# Patient Record
Sex: FEMALE | Race: WHITE | NOT HISPANIC OR LATINO | URBAN - METROPOLITAN AREA
[De-identification: names, ages, dates, MRNs, and addresses within clinical notes are randomized per-mention and may not be internally consistent; named-entity substitution may affect disease eponyms.]

---

## 2022-07-09 ENCOUNTER — TELEPHONE ENCOUNTER (OUTPATIENT)
Dept: URBAN - METROPOLITAN AREA CLINIC 121 | Facility: CLINIC | Age: 62
End: 2022-07-09

## 2022-07-10 ENCOUNTER — TELEPHONE ENCOUNTER (OUTPATIENT)
Dept: URBAN - METROPOLITAN AREA CLINIC 121 | Facility: CLINIC | Age: 62
End: 2022-07-10

## 2022-07-10 RX ORDER — INSULIN GLARGINE 100 [IU]/ML
INJECTION, SOLUTION SUBCUTANEOUS
Refills: 0 | Status: ACTIVE | COMMUNITY
Start: 2015-04-27

## 2022-07-10 RX ORDER — LEVOTHYROXINE SODIUM 125 UG/1
TABLET ORAL
Refills: 0 | Status: ACTIVE | COMMUNITY
Start: 2015-04-27

## 2022-07-10 RX ORDER — LEVOTHYROXINE SODIUM 112 UG/1
TABLET ORAL TAKE AS DIRECTED
Refills: 0 | Status: ACTIVE | COMMUNITY
Start: 2014-12-03

## 2022-07-10 RX ORDER — OMEPRAZOLE 40 MG/1
CAPSULE, DELAYED RELEASE ORAL TWICE A DAY
Refills: 0 | Status: ACTIVE | COMMUNITY
Start: 2015-04-27

## 2022-07-10 RX ORDER — OMEPRAZOLE 20 MG/1
CAPSULE, DELAYED RELEASE ORAL
Refills: 0 | Status: ACTIVE | COMMUNITY
Start: 2015-04-27

## 2022-07-10 RX ORDER — INSULIN GLARGINE 100 [IU]/ML
INJECTION, SOLUTION SUBCUTANEOUS TAKE AS DIRECTED
Refills: 0 | Status: ACTIVE | COMMUNITY
Start: 2015-02-04

## 2022-07-10 RX ORDER — SITAGLIPTIN PHOSPHATE 100 MG
TABLET ORAL
Refills: 0 | Status: ACTIVE | COMMUNITY
Start: 2015-04-27

## 2022-07-10 RX ORDER — SITAGLIPTIN PHOSPHATE 100 MG
TABLET ORAL TAKE AS DIRECTED
Refills: 0 | Status: ACTIVE | COMMUNITY
Start: 2014-12-16

## 2022-07-30 ENCOUNTER — TELEPHONE ENCOUNTER (OUTPATIENT)
Age: 62
End: 2022-07-30

## 2022-07-31 ENCOUNTER — TELEPHONE ENCOUNTER (OUTPATIENT)
Age: 62
End: 2022-07-31

## 2023-09-06 ENCOUNTER — TELEPHONE ENCOUNTER (OUTPATIENT)
Dept: URBAN - METROPOLITAN AREA CLINIC 7 | Facility: CLINIC | Age: 63
End: 2023-09-06

## 2023-10-22 PROBLEM — 112371000119108: Status: ACTIVE | Noted: 2023-10-22

## 2023-10-22 PROBLEM — 30144000: Status: ACTIVE | Noted: 2023-10-22

## 2023-10-22 PROBLEM — 84410009: Status: ACTIVE | Noted: 2023-10-22

## 2023-10-22 PROBLEM — 235938004: Status: ACTIVE | Noted: 2023-10-22

## 2023-10-23 ENCOUNTER — LAB OUTSIDE AN ENCOUNTER (OUTPATIENT)
Dept: URBAN - METROPOLITAN AREA CLINIC 7 | Facility: CLINIC | Age: 63
End: 2023-10-23

## 2023-10-23 ENCOUNTER — OFFICE VISIT (OUTPATIENT)
Dept: URBAN - METROPOLITAN AREA CLINIC 7 | Facility: CLINIC | Age: 63
End: 2023-10-23
Payer: COMMERCIAL

## 2023-10-23 VITALS
BODY MASS INDEX: 30.58 KG/M2 | HEART RATE: 72 BPM | DIASTOLIC BLOOD PRESSURE: 80 MMHG | SYSTOLIC BLOOD PRESSURE: 132 MMHG | HEIGHT: 61 IN | WEIGHT: 162 LBS | TEMPERATURE: 98 F

## 2023-10-23 DIAGNOSIS — Z90.49 ACQUIRED ABSENCE OF OTHER SPECIFIED PARTS OF DIGESTIVE TRACT: ICD-10-CM

## 2023-10-23 DIAGNOSIS — Z90.410 ACQUIRED TOTAL ABSENCE OF PANCREAS: ICD-10-CM

## 2023-10-23 DIAGNOSIS — K76.0 HEPATIC STEATOSIS: ICD-10-CM

## 2023-10-23 DIAGNOSIS — R10.11 RUQ PAIN: ICD-10-CM

## 2023-10-23 DIAGNOSIS — K91.89 OTHER POSTPROCEDURAL COMPLICATIONS AND DISORDERS OF DIGESTIVE SYSTEM: ICD-10-CM

## 2023-10-23 DIAGNOSIS — K83.1 OBSTRUCTION OF BILE DUCT: ICD-10-CM

## 2023-10-23 DIAGNOSIS — E11.9 TYPE 2 DIABETES MELLITUS WITHOUT COMPLICATION: ICD-10-CM

## 2023-10-23 PROCEDURE — 99204 OFFICE O/P NEW MOD 45 MIN: CPT | Performed by: INTERNAL MEDICINE

## 2023-10-23 RX ORDER — INSULIN GLARGINE 100 [IU]/ML
INJECTION, SOLUTION SUBCUTANEOUS
Refills: 0 | Status: DISCONTINUED | COMMUNITY
Start: 2015-04-27

## 2023-10-23 RX ORDER — OMEPRAZOLE 20 MG/1
CAPSULE, DELAYED RELEASE ORAL
Refills: 0 | Status: DISCONTINUED | COMMUNITY
Start: 2015-04-27

## 2023-10-23 RX ORDER — EMPAGLIFLOZIN 25 MG/1
TABLET, FILM COATED ORAL
Qty: 90 TABLET | Status: DISCONTINUED | COMMUNITY

## 2023-10-23 RX ORDER — INSULIN DETEMIR 100 [IU]/ML
INJECTION, SOLUTION SUBCUTANEOUS
Qty: 60 UNSPECIFIED | Status: ACTIVE | COMMUNITY

## 2023-10-23 RX ORDER — SITAGLIPTIN PHOSPHATE 100 MG
TABLET ORAL TAKE AS DIRECTED
Refills: 0 | Status: ACTIVE | COMMUNITY
Start: 2014-12-16

## 2023-10-23 RX ORDER — ATORVASTATIN CALCIUM 20 MG/1
TABLET, FILM COATED ORAL
Qty: 30 TABLET | Status: DISCONTINUED | COMMUNITY

## 2023-10-23 RX ORDER — BUSPIRONE HYDROCHLORIDE 15 MG/1
TABLET ORAL
Qty: 180 TABLET | Status: DISCONTINUED | COMMUNITY

## 2023-10-23 RX ORDER — LEVOTHYROXINE SODIUM 112 UG/1
TABLET ORAL TAKE AS DIRECTED
Refills: 0 | Status: DISCONTINUED | COMMUNITY
Start: 2014-12-03

## 2023-10-23 RX ORDER — LEVOTHYROXINE SODIUM 125 UG/1
TABLET ORAL
Qty: 90 TABLET | Status: ACTIVE | COMMUNITY

## 2023-10-23 RX ORDER — INSULIN LISPRO 100 [IU]/ML
INJECTION, SOLUTION INTRAVENOUS; SUBCUTANEOUS
Qty: 45 UNSPECIFIED | Status: ACTIVE | COMMUNITY

## 2023-10-23 RX ORDER — OMEPRAZOLE 40 MG/1
1 CAPSULE 30 MINUTES BEFORE MORNING MEAL CAPSULE, DELAYED RELEASE ORAL ONCE A DAY
Refills: 0 | Status: DISCONTINUED | COMMUNITY
Start: 2015-04-27

## 2023-10-23 RX ORDER — ATORVASTATIN CALCIUM 20 MG/1
1 TABLET TABLET, FILM COATED ORAL ONCE A DAY
Qty: 30 TABLET | Status: ACTIVE | COMMUNITY

## 2023-10-23 RX ORDER — LEVOTHYROXINE SODIUM 125 UG/1
TABLET ORAL
Refills: 0 | Status: DISCONTINUED | COMMUNITY
Start: 2015-04-27

## 2023-10-23 RX ORDER — METFORMIN HYDROCHLORIDE TABLET 500 MG/1
1 TABLET WITH A MEAL TABLET ORAL TWICE A DAY
Qty: 180 TABLET | Status: ACTIVE | COMMUNITY

## 2023-10-23 NOTE — HPI-TODAY'S VISIT:
Patient was last seen in the practice in 2018 and is new to me.  She has a prior history of benign anastomotic stricture would presented to the hospital in the past for obstructive jaundice and ended up with a Whipple for benign disease.  She had multiple prior failed ERCP attempts and also had a failed ERCP attempt at a tertiary center at the [facility]  She subsequently had improvement in jaundice and was not having abdominal pain fevers chills or weight loss.  She did have a biliary stent that was placed in 2009 and multiple attempts at removal have not been successful.  She did have a small bowel enteroscopy by Dr. Ryan back in 2018 where a presumed hepaticojejunostomy was seen although no obvious opening was identified.  The orifice could not be cannulated although a pancreatic duct stent was noted as well which was removed.  She did ultimately have biliary drainage established via IR with an internal/external biliary drain and plan was for a rendezvous procedure.  Her initial surgery was in 2004 which ultimately had been complicated by hepaticojejunostomy strictures and recurrent episodes of cholangitis.  She has been lost to follow-up since that time. Labs were recently in September 2023 demonstrated creatinine 0.53, sodium 142, alk phos 151, normal bilirubin at 0.6, and normal LFTs.  Hemoglobin 13.6, platelets 263, white count 7.7.  Iron saturation 15 with a ferritin 102.  A1c 6.2. She tells me she has been having fevers on the weekends, 102-103F. Was given antibiotics in the ER for UTI. Some right-sided discomfort associated with these fevers. No jaundice. She tells me she is drain free at this time, and she tells me she did have a rendezvous. Had a procedure with Dr. Bradshaw 2-3 years. Last fever 1 month ago. She had a fibroscan at radiology [facility], no fibrosis, some steatosis.

## 2023-10-30 ENCOUNTER — TELEPHONE ENCOUNTER (OUTPATIENT)
Dept: URBAN - METROPOLITAN AREA CLINIC 23 | Facility: CLINIC | Age: 63
End: 2023-10-30

## 2023-11-07 ENCOUNTER — TELEPHONE ENCOUNTER (OUTPATIENT)
Dept: URBAN - METROPOLITAN AREA CLINIC 8 | Facility: CLINIC | Age: 63
End: 2023-11-07

## 2023-11-07 RX ORDER — CIPROFLOXACIN HYDROCHLORIDE 500 MG/1
1 TABLET TABLET, FILM COATED ORAL TWICE A DAY
Qty: 20 TABLET | Refills: 0 | OUTPATIENT
Start: 2023-11-07 | End: 2023-11-17

## 2023-11-07 RX ORDER — METRONIDAZOLE 500 MG/1
1 TABLET TABLET ORAL THREE TIMES A DAY
Qty: 30 TABLET | Refills: 0 | OUTPATIENT
Start: 2023-11-07 | End: 2023-11-17

## 2023-12-07 ENCOUNTER — OFFICE VISIT (OUTPATIENT)
Dept: URBAN - METROPOLITAN AREA CLINIC 7 | Facility: CLINIC | Age: 63
End: 2023-12-07
Payer: COMMERCIAL

## 2023-12-07 VITALS
DIASTOLIC BLOOD PRESSURE: 70 MMHG | BODY MASS INDEX: 29.45 KG/M2 | WEIGHT: 156 LBS | RESPIRATION RATE: 16 BRPM | HEIGHT: 61 IN | SYSTOLIC BLOOD PRESSURE: 120 MMHG | TEMPERATURE: 97.8 F

## 2023-12-07 DIAGNOSIS — Z90.410 ACQUIRED TOTAL ABSENCE OF PANCREAS: ICD-10-CM

## 2023-12-07 DIAGNOSIS — Z90.49 ACQUIRED ABSENCE OF OTHER SPECIFIED PARTS OF DIGESTIVE TRACT: ICD-10-CM

## 2023-12-07 DIAGNOSIS — K76.0 HEPATIC STEATOSIS: ICD-10-CM

## 2023-12-07 DIAGNOSIS — E11.9 TYPE 2 DIABETES MELLITUS WITHOUT COMPLICATION: ICD-10-CM

## 2023-12-07 PROCEDURE — 99214 OFFICE O/P EST MOD 30 MIN: CPT | Performed by: INTERNAL MEDICINE

## 2023-12-07 RX ORDER — INSULIN LISPRO 100 [IU]/ML
INJECTION, SOLUTION INTRAVENOUS; SUBCUTANEOUS
Qty: 45 UNSPECIFIED | Status: ACTIVE | COMMUNITY

## 2023-12-07 RX ORDER — INSULIN DETEMIR 100 [IU]/ML
INJECTION, SOLUTION SUBCUTANEOUS
Qty: 60 UNSPECIFIED | Status: ACTIVE | COMMUNITY

## 2023-12-07 RX ORDER — ATORVASTATIN CALCIUM 20 MG/1
1 TABLET TABLET, FILM COATED ORAL ONCE A DAY
Qty: 30 TABLET | Status: ACTIVE | COMMUNITY

## 2023-12-07 RX ORDER — SITAGLIPTIN PHOSPHATE 100 MG
TABLET ORAL TAKE AS DIRECTED
Refills: 0 | Status: DISCONTINUED | COMMUNITY
Start: 2014-12-16

## 2023-12-07 RX ORDER — LEVOTHYROXINE SODIUM 125 UG/1
TABLET ORAL
Qty: 90 TABLET | Status: ACTIVE | COMMUNITY

## 2023-12-07 RX ORDER — METFORMIN HYDROCHLORIDE TABLET 500 MG/1
1 TABLET WITH A MEAL TABLET ORAL TWICE A DAY
Qty: 180 TABLET | Status: ACTIVE | COMMUNITY

## 2023-12-07 NOTE — HPI-TODAY'S VISIT:
LV 10/2023. She has a prior history of benign anastomotic stricture would presented to the hospital in the past for obstructive jaundice and ended up with a Whipple for benign disease.  She had multiple prior failed ERCP attempts and also had a failed ERCP attempt at a tertiary center at the [facility].  She subsequently had improvement in jaundice and was not having abdominal pain fevers chills or weight loss.  She did have a biliary stent that was placed in 2009 and multiple attempts at removal have not been successful.  She did have a small bowel enteroscopy by Dr. Ryan back in 2018 where a presumed hepaticojejunostomy was seen although no obvious opening was identified.  The orifice could not be cannulated although a pancreatic duct stent was noted as well which was removed.  She did ultimately have biliary drainage established via IR with an internal/external biliary drain and plan was for a rendezvous procedure.  Her initial surgery was in 2004 which ultimately had been complicated by hepaticojejunostomy strictures and recurrent episodes of cholangitis.  She has been lost to follow-up since that time. Labs were recently in September 2023 demonstrated creatinine 0.53, sodium 142, alk phos 151, normal bilirubin at 0.6, and normal LFTs.  Hemoglobin 13.6, platelets 263, white count 7.7.  Iron saturation 15 with a ferritin 102.  A1c 6.2. She tells me she has been having fevers on the weekends, 102-103F. Was given antibiotics in the ER for UTI. Some right-sided discomfort associated with these fevers. No jaundice. She tells me she is drain free at this time, stent free, and she tells me she did have a rendezvous. Had a procedure with Dr. Bradshaw 2-3 years. Last fever 1 month ago. She had a fibroscan at [facility], no fibrosis, some steatosis. Last visit, I wanted to get the records of her last ERCP which was done by Dr. Bradshaw, but may need additional biliary evaluation.  Plan was to get an MRCP to evaluate the bile duct and will then see if she needs an additional ERCP or endoscopy.  MRCP November 2023 demonstrated findings suggestive of acute on chronic cholangitis likely in the setting of chronic biliary stasis and reflux.  She was treated with Cipro and metronidazole for 10 days.  I also referred her to Dr. Bradshaw for consideration of ERCP. she ended up having a ERCP in mid November 2023 with placement of a plastic stent into her bile duct.  Follow-up now. She tells me she had a rough experience post-ERCP, but stent was placed, and was successful. She did have some fever, and threw up post-procedure. She did not have further antibiotics after her ERCP. Has lost some weight. Decrease appetite.

## 2023-12-12 LAB
% SATURATION: 19
A/G RATIO: 1.4
ABSOLUTE BASOPHILS: 20
ABSOLUTE EOSINOPHILS: 244
ABSOLUTE LYMPHOCYTES: 920
ABSOLUTE MONOCYTES: 312
ABSOLUTE NEUTROPHILS: 2504
ALBUMIN: 4.1
ALKALINE PHOSPHATASE: 119
ALT (SGPT): 22
AST (SGOT): 30
BASOPHILS: 0.5
BILIRUBIN, TOTAL: 0.5
BUN/CREATININE RATIO: (no result)
BUN: 8
C-REACTIVE PROTEIN, QUANT: 2.8
CALCIUM: 9.2
CARBON DIOXIDE, TOTAL: 26
CHLORIDE: 108
CREATININE: 0.67
EGFR: 97
EOSINOPHILS: 6.1
FERRITIN: 56
GLOBULIN, TOTAL: 3
GLUCOSE: 79
HEMATOCRIT: 40.2
HEMOGLOBIN: 13
IRON BINDING CAPACITY: 303
IRON, TOTAL: 59
LYMPHOCYTES: 23
MCH: 28
MCHC: 32.3
MCV: 86.6
MONOCYTES: 7.8
MPV: 10.9
NEUTROPHILS: 62.6
PLATELET COUNT: 208
POTASSIUM: 4.7
PROTEIN, TOTAL: 7.1
RDW: 13.9
RED BLOOD CELL COUNT: 4.64
SODIUM: 140
T4, FREE: 1.2
TSH W/REFLEX TO FT4: 0.19
WHITE BLOOD CELL COUNT: 4

## 2024-01-19 ENCOUNTER — TELEPHONE ENCOUNTER (OUTPATIENT)
Dept: URBAN - METROPOLITAN AREA CLINIC 7 | Facility: CLINIC | Age: 64
End: 2024-01-19

## 2024-01-19 RX ORDER — URSODIOL 500 MG/1
1 TABLET TABLET ORAL TWICE A DAY
Qty: 60 TABLET | Refills: 1 | OUTPATIENT
Start: 2024-01-30

## 2024-02-13 LAB
ALBUMIN/GLOBULIN RATIO: 1.6
ALBUMIN: 4.3
ALKALINE PHOSPHATASE: 118
ALT (SGPT): 18
AST (SGOT): 22
BILIRUBIN, DIRECT: 0.2
BILIRUBIN, INDIRECT: 0.3
BILIRUBIN, TOTAL: 0.5
GLOBULIN: 2.7
PROTEIN, TOTAL: 7

## 2024-03-11 ENCOUNTER — OV EP (OUTPATIENT)
Dept: URBAN - METROPOLITAN AREA CLINIC 7 | Facility: CLINIC | Age: 64
End: 2024-03-11
Payer: COMMERCIAL

## 2024-03-11 VITALS
WEIGHT: 152 LBS | SYSTOLIC BLOOD PRESSURE: 120 MMHG | BODY MASS INDEX: 28.7 KG/M2 | TEMPERATURE: 97.8 F | RESPIRATION RATE: 16 BRPM | DIASTOLIC BLOOD PRESSURE: 70 MMHG | HEIGHT: 61 IN

## 2024-03-11 DIAGNOSIS — Z90.410 ACQUIRED TOTAL ABSENCE OF PANCREAS: ICD-10-CM

## 2024-03-11 DIAGNOSIS — Z90.49 ACQUIRED ABSENCE OF OTHER SPECIFIED PARTS OF DIGESTIVE TRACT: ICD-10-CM

## 2024-03-11 DIAGNOSIS — E11.9 TYPE 2 DIABETES MELLITUS WITHOUT COMPLICATION: ICD-10-CM

## 2024-03-11 DIAGNOSIS — K83.1 OBSTRUCTION OF BILE DUCT: ICD-10-CM

## 2024-03-11 DIAGNOSIS — R10.11 RUQ PAIN: ICD-10-CM

## 2024-03-11 DIAGNOSIS — K76.0 HEPATIC STEATOSIS: ICD-10-CM

## 2024-03-11 DIAGNOSIS — K91.89 OTHER POSTPROCEDURAL COMPLICATIONS AND DISORDERS OF DIGESTIVE SYSTEM: ICD-10-CM

## 2024-03-11 PROCEDURE — 99214 OFFICE O/P EST MOD 30 MIN: CPT | Performed by: INTERNAL MEDICINE

## 2024-03-11 RX ORDER — URSODIOL 500 MG/1
1 TABLET TABLET ORAL TWICE A DAY
Qty: 60 TABLET | Refills: 1 | Status: ACTIVE | COMMUNITY
Start: 2024-01-30

## 2024-03-11 RX ORDER — ATORVASTATIN CALCIUM 20 MG/1
1 TABLET TABLET, FILM COATED ORAL ONCE A DAY
Qty: 30 TABLET | Status: ACTIVE | COMMUNITY

## 2024-03-11 RX ORDER — URSODIOL 500 MG/1
1 TABLET TABLET ORAL TWICE A DAY
Qty: 180 | Refills: 3
Start: 2024-01-30

## 2024-03-11 RX ORDER — INSULIN DETEMIR 100 [IU]/ML
INJECTION, SOLUTION SUBCUTANEOUS
Qty: 60 UNSPECIFIED | Status: ACTIVE | COMMUNITY

## 2024-03-11 RX ORDER — INSULIN LISPRO 100 [IU]/ML
INJECTION, SOLUTION INTRAVENOUS; SUBCUTANEOUS
Qty: 45 UNSPECIFIED | Status: ACTIVE | COMMUNITY

## 2024-03-11 RX ORDER — LEVOTHYROXINE SODIUM 112 UG/1
1 TABLET IN THE MORNING ON AN EMPTY STOMACH TABLET ORAL ONCE A DAY
Qty: 90 TABLET | Status: ACTIVE | COMMUNITY

## 2024-03-11 RX ORDER — METFORMIN HYDROCHLORIDE TABLET 500 MG/1
1 TABLET WITH A MEAL TABLET ORAL TWICE A DAY
Qty: 180 TABLET | Status: ACTIVE | COMMUNITY

## 2024-03-11 NOTE — HPI-TODAY'S VISIT:
LV 12/2023. She has a prior history of benign anastomotic stricture would presented to the hospital in the past for obstructive jaundice and ended up with a Whipple for benign disease.  She had multiple prior failed ERCP attempts and also had a failed ERCP attempt at a tertiary center at the [facility].  She subsequently had improvement in jaundice and was not having abdominal pain fevers chills or weight loss.  She did have a biliary stent that was placed in 2009 and multiple attempts at removal have not been successful.  She did have a small bowel enteroscopy by Dr. Ryan back in 2018 where a presumed hepaticojejunostomy was seen although no obvious opening was identified.  The orifice could not be cannulated although a pancreatic duct stent was noted as well which was removed.  She did ultimately have biliary drainage established via IR with an internal/external biliary drain and plan was for a rendezvous procedure.  Her initial surgery was in 2004 which ultimately had been complicated by hepaticojejunostomy strictures and recurrent episodes of cholangitis.  She has been lost to follow-up since that time. Labs were recently in September 2023 demonstrated creatinine 0.53, sodium 142, alk phos 151, normal bilirubin at 0.6, and normal LFTs.  Hemoglobin 13.6, platelets 263, white count 7.7.  Iron saturation 15 with a ferritin 102.  A1c 6.2. She tells me she has been having fevers on the weekends, 102-103F. Was given antibiotics in the ER for UTI. Some right-sided discomfort associated with these fevers. No jaundice. She tells me she is drain free at this time, stent free, and she tells me she did have a rendezvous. Had a procedure with Dr. Bradshaw 2-3 years. Last fever 1 month ago. She had a fibroscan at [facility], no fibrosis, some steatosis. Last visit, I wanted to get the records of her last ERCP which was done by Dr. Bradshaw, but may need additional biliary evaluation.  Plan was to get an MRCP to evaluate the bile duct and will then see if she needs an additional ERCP or endoscopy.  MRCP November 2023 demonstrated findings suggestive of acute on chronic cholangitis likely in the setting of chronic biliary stasis and reflux.  She was treated with Cipro and metronidazole for 10 days.  I also referred her to Dr. Bradshaw for consideration of ERCP. she ended up having a ERCP in mid November 2023 with placement of a plastic stent into her bile duct.  Follow-up now. She tells me she had a rough experience post-ERCP, but stent was placed, and was successful. She did have some fever, and threw up post-procedure. She did not have further antibiotics after her ERCP. Has lost some weight. Decrease appetite. Plan was repeat ERCP for removal of biliary plastic stent, but she is better from a cholangitis perspective. Will need to see if her anastomotic stenosis is better, or if she has stones. Will get LFT's. LFT's normal in 1/2024 and repeat ERCP 1/2024 with occluded plastic stent (removed with biliary sweep) and balloon dilation performed to 8 mm. No stent was placed. Repeat LFTs in 2/2024 were normal. FU now. She is doing well status post ERCP recently and normal LFTs post procedure. She has been on ursodiol. No fevers, no jaundice. Some RUQ pain.

## 2024-07-12 ENCOUNTER — TELEPHONE ENCOUNTER (OUTPATIENT)
Dept: URBAN - METROPOLITAN AREA CLINIC 7 | Facility: CLINIC | Age: 64
End: 2024-07-12

## 2024-07-24 ENCOUNTER — OFFICE VISIT (OUTPATIENT)
Dept: URBAN - METROPOLITAN AREA CLINIC 7 | Facility: CLINIC | Age: 64
End: 2024-07-24
Payer: COMMERCIAL

## 2024-07-24 ENCOUNTER — LAB OUTSIDE AN ENCOUNTER (OUTPATIENT)
Dept: URBAN - METROPOLITAN AREA CLINIC 7 | Facility: CLINIC | Age: 64
End: 2024-07-24

## 2024-07-24 ENCOUNTER — DASHBOARD ENCOUNTERS (OUTPATIENT)
Age: 64
End: 2024-07-24

## 2024-07-24 VITALS
WEIGHT: 150 LBS | TEMPERATURE: 97.6 F | DIASTOLIC BLOOD PRESSURE: 70 MMHG | HEIGHT: 61 IN | BODY MASS INDEX: 28.32 KG/M2 | SYSTOLIC BLOOD PRESSURE: 120 MMHG | RESPIRATION RATE: 16 BRPM

## 2024-07-24 DIAGNOSIS — K83.1 OBSTRUCTION OF BILE DUCT: ICD-10-CM

## 2024-07-24 DIAGNOSIS — Z90.410 ACQUIRED TOTAL ABSENCE OF PANCREAS: ICD-10-CM

## 2024-07-24 DIAGNOSIS — E11.9 TYPE 2 DIABETES MELLITUS WITHOUT COMPLICATION: ICD-10-CM

## 2024-07-24 DIAGNOSIS — K76.0 HEPATIC STEATOSIS: ICD-10-CM

## 2024-07-24 PROCEDURE — 99214 OFFICE O/P EST MOD 30 MIN: CPT | Performed by: INTERNAL MEDICINE

## 2024-07-24 RX ORDER — LEVOTHYROXINE SODIUM 150 UG/1
1 TABLET IN THE MORNING ON AN EMPTY STOMACH TABLET ORAL ONCE A DAY
Qty: 90 TABLET | Status: ACTIVE | COMMUNITY

## 2024-07-24 RX ORDER — INSULIN DETEMIR 100 [IU]/ML
INJECTION, SOLUTION SUBCUTANEOUS
Qty: 60 UNSPECIFIED | Status: ACTIVE | COMMUNITY

## 2024-07-24 RX ORDER — INSULIN LISPRO 100 [IU]/ML
INJECTION, SOLUTION INTRAVENOUS; SUBCUTANEOUS
Qty: 45 UNSPECIFIED | Status: ACTIVE | COMMUNITY

## 2024-07-24 RX ORDER — ATORVASTATIN CALCIUM 20 MG/1
1 TABLET TABLET, FILM COATED ORAL ONCE A DAY
Qty: 30 TABLET | Status: ACTIVE | COMMUNITY

## 2024-07-24 RX ORDER — URSODIOL 500 MG/1
1 TABLET TABLET ORAL TWICE A DAY
Qty: 180 | Refills: 3 | Status: ACTIVE | COMMUNITY
Start: 2024-01-30

## 2024-07-24 RX ORDER — METFORMIN HYDROCHLORIDE TABLET 500 MG/1
1 TABLET WITH A MEAL TABLET ORAL TWICE A DAY
Qty: 180 TABLET | Status: ACTIVE | COMMUNITY

## 2024-07-24 NOTE — HPI-TODAY'S VISIT:
LV 3/2024. She has a prior history of benign anastomotic stricture would presented to the hospital in the past for obstructive jaundice and ended up with a Whipple for benign disease.  She had multiple prior failed ERCP attempts and also had a failed ERCP attempt at a tertiary center at the [facility].  She subsequently had improvement in jaundice and was not having abdominal pain fevers chills or weight loss.  She did have a biliary stent that was placed in 2009 and multiple attempts at removal have not been successful.  She did have a small bowel enteroscopy by Dr. Ryan back in 2018 where a presumed hepaticojejunostomy was seen although no obvious opening was identified.  The orifice could not be cannulated although a pancreatic duct stent was noted as well which was removed.  She did ultimately have biliary drainage established via IR with an internal/external biliary drain and plan was for a rendezvous procedure.  Her initial surgery was in 2004 which ultimately had been complicated by hepaticojejunostomy strictures and recurrent episodes of cholangitis.  She has been lost to follow-up since that time. Labs were recently in September 2023 demonstrated creatinine 0.53, sodium 142, alk phos 151, normal bilirubin at 0.6, and normal LFTs.  Hemoglobin 13.6, platelets 263, white count 7.7.  Iron saturation 15 with a ferritin 102.  A1c 6.2. She tells me she has been having fevers on the weekends, 102-103F. Was given antibiotics in the ER for UTI. Some right-sided discomfort associated with these fevers. No jaundice. She tells me she is drain free at this time, stent free, and she tells me she did have a rendezvous. Had a procedure with Dr. Bradshaw 2-3 years. Last fever 1 month ago. She had a fibroscan at [facility], no fibrosis, some steatosis. Last visit, I wanted to get the records of her last ERCP which was done by Dr. Bradshaw, but may need additional biliary evaluation.  Plan was to get an MRCP to evaluate the bile duct and will then see if she needs an additional ERCP or endoscopy.  MRCP November 2023 demonstrated findings suggestive of acute on chronic cholangitis likely in the setting of chronic biliary stasis and reflux.  She was treated with Cipro and metronidazole for 10 days.  I also referred her to Dr. Bradshaw for consideration of ERCP. She ended up having a ERCP in mid November 2023 with placement of a plastic stent into her bile duct.  Follow-up now. She tells me she had a rough experience post-ERCP, but stent was placed, and was successful. She did have some fever, and threw up post-procedure. She did not have further antibiotics after her ERCP. Has lost some weight. Decrease appetite. Plan was repeat ERCP for removal of biliary plastic stent, but she is better from a cholangitis perspective. Will need to see if her anastomotic stenosis is better, or if she has stones. Will get LFT's. LFT's normal in 1/2024 and repeat ERCP 1/2024 with occluded plastic stent (removed with biliary sweep) and balloon dilation performed to 8 mm. No stent was placed. Repeat LFTs in 2/2024 were normal. She is doing well status post ERCP recently and normal LFTs post procedure. She has been on ursodiol. No fevers, no jaundice. Some RUQ pain. She wsa feeling largely well since her procedure, normal LFTs 2/2024, and will continue dustin to maintain biliary flow. FU 6 months, and repeat ERCPs will be done as needed. She had a cologuard about 3 years ago, and this was negative. CT PE recently with no PE. Had fibroscan with findings consistent with F3/F4 fibrosis. This was done after an US recently showed hepatic steatosis, possible CBD dilation to 1.2 cm. FU now.  She was having RUQ pain, and this led to US. Not having fevers, no jaundice. Non-toxic. Non-acute situation. Still taking ursodiol. Alk phos was recently 134, normal AST/ALT, normal bilirubin. Has some pruritus. Pain is more so with movement, not so much. Weight is down 2 lbs.

## 2024-08-02 ENCOUNTER — TELEPHONE ENCOUNTER (OUTPATIENT)
Dept: URBAN - METROPOLITAN AREA CLINIC 7 | Facility: CLINIC | Age: 64
End: 2024-08-02

## 2024-08-02 ENCOUNTER — LAB OUTSIDE AN ENCOUNTER (OUTPATIENT)
Dept: URBAN - METROPOLITAN AREA CLINIC 7 | Facility: CLINIC | Age: 64
End: 2024-08-02

## 2024-09-12 ENCOUNTER — OFFICE VISIT (OUTPATIENT)
Dept: URBAN - METROPOLITAN AREA CLINIC 7 | Facility: CLINIC | Age: 64
End: 2024-09-12

## 2024-09-25 ENCOUNTER — OFFICE VISIT (OUTPATIENT)
Dept: URBAN - METROPOLITAN AREA CLINIC 7 | Facility: CLINIC | Age: 64
End: 2024-09-25
Payer: COMMERCIAL

## 2024-09-25 VITALS
WEIGHT: 151 LBS | HEIGHT: 61 IN | SYSTOLIC BLOOD PRESSURE: 120 MMHG | RESPIRATION RATE: 16 BRPM | TEMPERATURE: 97.6 F | HEART RATE: 77 BPM | BODY MASS INDEX: 28.51 KG/M2 | DIASTOLIC BLOOD PRESSURE: 80 MMHG

## 2024-09-25 DIAGNOSIS — K76.0 HEPATIC STEATOSIS: ICD-10-CM

## 2024-09-25 DIAGNOSIS — R94.5 LIVER FUNCTION STUDY, ABNORMAL: ICD-10-CM

## 2024-09-25 DIAGNOSIS — Z90.49 ACQUIRED ABSENCE OF OTHER SPECIFIED PARTS OF DIGESTIVE TRACT: ICD-10-CM

## 2024-09-25 DIAGNOSIS — Z90.410 ACQUIRED TOTAL ABSENCE OF PANCREAS: ICD-10-CM

## 2024-09-25 DIAGNOSIS — E11.9 TYPE 2 DIABETES MELLITUS WITHOUT COMPLICATION: ICD-10-CM

## 2024-09-25 PROCEDURE — 99214 OFFICE O/P EST MOD 30 MIN: CPT | Performed by: INTERNAL MEDICINE

## 2024-09-25 RX ORDER — URSODIOL 500 MG/1
1 TABLET TABLET ORAL TWICE A DAY
Qty: 180 | Refills: 3 | Status: ACTIVE | COMMUNITY
Start: 2024-01-30

## 2024-09-25 RX ORDER — LEVOTHYROXINE SODIUM 150 UG/1
1 TABLET IN THE MORNING ON AN EMPTY STOMACH TABLET ORAL ONCE A DAY
Qty: 90 TABLET | Status: ACTIVE | COMMUNITY

## 2024-09-25 RX ORDER — INSULIN LISPRO 100 [IU]/ML
INJECTION, SOLUTION INTRAVENOUS; SUBCUTANEOUS
Qty: 45 UNSPECIFIED | Status: ACTIVE | COMMUNITY

## 2024-09-25 RX ORDER — METFORMIN HYDROCHLORIDE TABLET 500 MG/1
1 TABLET WITH A MEAL TABLET ORAL TWICE A DAY
Qty: 180 TABLET | Status: ACTIVE | COMMUNITY

## 2024-09-25 RX ORDER — ATORVASTATIN CALCIUM 20 MG/1
1 TABLET TABLET, FILM COATED ORAL ONCE A DAY
Qty: 30 TABLET | Status: ACTIVE | COMMUNITY

## 2024-09-25 RX ORDER — INSULIN DETEMIR 100 [IU]/ML
INJECTION, SOLUTION SUBCUTANEOUS
Qty: 60 UNSPECIFIED | Status: ACTIVE | COMMUNITY

## 2024-09-25 NOTE — HPI-TODAY'S VISIT:
LV 7/2024. She has a prior history of benign anastomotic stricture would presented to the hospital in the past for obstructive jaundice and ended up with a Whipple for benign disease.  She had multiple prior failed ERCP attempts and also had a failed ERCP attempt at a tertiary center at the [facility].  She subsequently had improvement in jaundice and was not having abdominal pain fevers chills or weight loss.  She did have a biliary stent that was placed in 2009 and multiple attempts at removal have not been successful.  She did have a small bowel enteroscopy by Dr. Ryan back in 2018 where a presumed hepaticojejunostomy was seen although no obvious opening was identified.  The orifice could not be cannulated although a pancreatic duct stent was noted as well which was removed.  She did ultimately have biliary drainage established via IR with an internal/external biliary drain and plan was for a rendezvous procedure.  Her initial surgery was in 2004 which ultimately had been complicated by hepaticojejunostomy strictures and recurrent episodes of cholangitis.  She has been lost to follow-up since that time. Labs were recently in September 2023 demonstrated creatinine 0.53, sodium 142, alk phos 151, normal bilirubin at 0.6, and normal LFTs.  Hemoglobin 13.6, platelets 263, white count 7.7.  Iron saturation 15 with a ferritin 102.  A1c 6.2. She tells me she has been having fevers on the weekends, 102-103F. Was given antibiotics in the ER for UTI. Some right-sided discomfort associated with these fevers. No jaundice. She tells me she is drain free at this time, stent free, and she tells me she did have a rendezvous. Had a procedure with Dr. Bradshaw 2-3 years. Last fever 1 month ago. She had a fibroscan at [facility], no fibrosis, some steatosis. Last visit, I wanted to get the records of her last ERCP which was done by Dr. Bradshaw, but may need additional biliary evaluation.  Plan was to get an MRCP to evaluate the bile duct and will then see if she needs an additional ERCP or endoscopy.  MRCP November 2023 demonstrated findings suggestive of acute on chronic cholangitis likely in the setting of chronic biliary stasis and reflux.  She was treated with Cipro and metronidazole for 10 days.  I also referred her to Dr. Bradshaw for consideration of ERCP. She ended up having a ERCP in mid November 2023 with placement of a plastic stent into her bile duct.  Follow-up now. She tells me she had a rough experience post-ERCP, but stent was placed, and was successful. She did have some fever, and threw up post-procedure. She did not have further antibiotics after her ERCP. Has lost some weight. Decrease appetite. Plan was repeat ERCP for removal of biliary plastic stent, but she is better from a cholangitis perspective. Will need to see if her anastomotic stenosis is better, or if she has stones. Will get LFT's. LFT's normal in 1/2024 and repeat ERCP 1/2024 with occluded plastic stent (removed with biliary sweep) and balloon dilation performed to 8 mm. No stent was placed. Repeat LFTs in 2/2024 were normal. She is doing well status post ERCP recently and normal LFTs post procedure. She has been on ursodiol. No fevers, no jaundice. Some RUQ pain. She wsa feeling largely well since her procedure, normal LFTs 2/2024, and will continue dustin to maintain biliary flow. FU 6 months, and repeat ERCPs will be done as needed. She had a cologuard about 3 years ago, and this was negative. CT PE recently with no PE. Had fibroscan with findings consistent with F3/F4 fibrosis. This was done after an US recently showed hepatic steatosis, possible CBD dilation to 1.2 cm. She was having RUQ pain, and this led to US. Not having fevers, no jaundice. Non-toxic. Non-acute situation. Still taking ursodiol. Alk phos was recently 134, normal AST/ALT, normal bilirubin. Has some pruritus. Pain is more so with movement, not so much. Weight is down 2 lbs. Alk phos was slightly elevated compared to prior, so will repeat MRCP to assess for obstruction/cholangitis. She is clinically well and I don't think her RUQ pain is necessarily biliary in nature. Will have to decide on repeat ERCP (which was a difficult procedure) versus surgical revision. May send to tertiary center for next ERCP. Fibroscan is likely an overestimate of fibrosis, which is mainly from biliary reasons most likely (cholangiopathy/biliary cirrhosis). Will repeat fibroscan in 6 months. Fibroscan 8/2024 with S3/F3 fibrosis. MRI/MRCP 8/2024 with CHD 8mm, PD 5mm, and mild left peribiliary enhancement, stable compared to 2023, and mild changes of focal fibrosis in liver. Wanted repeat LFTs. Did not feel re-attempt at ERCP would be necessary. FU now.  She is not having fevers, feeling well, non-toxic, not having symptoms of cholangitis. Recent LFTs are normal (normal alk phos, normal AST/ALT). No changes consistent with cirrhosis. F3 fibrosis on fibroscan.

## 2025-03-03 ENCOUNTER — LAB OUTSIDE AN ENCOUNTER (OUTPATIENT)
Dept: URBAN - METROPOLITAN AREA CLINIC 7 | Facility: CLINIC | Age: 65
End: 2025-03-03

## 2025-03-17 ENCOUNTER — LAB OUTSIDE AN ENCOUNTER (OUTPATIENT)
Dept: URBAN - METROPOLITAN AREA CLINIC 7 | Facility: CLINIC | Age: 65
End: 2025-03-17

## 2025-03-18 LAB
ALBUMIN: 4.3
ALKALINE PHOSPHATASE: 158
ALT (SGPT): 13
AST (SGOT): 19
BILIRUBIN, DIRECT: 0.17
BILIRUBIN, TOTAL: 0.5
PROTEIN, TOTAL: 7.4

## 2025-03-25 ENCOUNTER — P2P PATIENT RECORD (OUTPATIENT)
Age: 65
End: 2025-03-25

## 2025-03-26 ENCOUNTER — OFFICE VISIT (OUTPATIENT)
Dept: URBAN - METROPOLITAN AREA CLINIC 7 | Facility: CLINIC | Age: 65
End: 2025-03-26

## 2025-03-26 ENCOUNTER — OFFICE VISIT (OUTPATIENT)
Dept: URBAN - METROPOLITAN AREA CLINIC 7 | Facility: CLINIC | Age: 65
End: 2025-03-26
Payer: COMMERCIAL

## 2025-03-26 DIAGNOSIS — R94.5 LIVER FUNCTION STUDY, ABNORMAL: ICD-10-CM

## 2025-03-26 DIAGNOSIS — Z90.410 HISTORY OF WHIPPLE PROCEDURE: ICD-10-CM

## 2025-03-26 DIAGNOSIS — K91.89 OTHER POSTPROCEDURAL COMPLICATIONS AND DISORDERS OF DIGESTIVE SYSTEM: ICD-10-CM

## 2025-03-26 DIAGNOSIS — K76.0 HEPATIC STEATOSIS: ICD-10-CM

## 2025-03-26 DIAGNOSIS — R10.11 RUQ PAIN: ICD-10-CM

## 2025-03-26 DIAGNOSIS — E11.9 TYPE 2 DIABETES MELLITUS WITHOUT COMPLICATION: ICD-10-CM

## 2025-03-26 DIAGNOSIS — Z90.49 ACQUIRED ABSENCE OF OTHER SPECIFIED PARTS OF DIGESTIVE TRACT: ICD-10-CM

## 2025-03-26 DIAGNOSIS — K83.1 OBSTRUCTION OF BILE DUCT: ICD-10-CM

## 2025-03-26 DIAGNOSIS — Z90.410 ACQUIRED TOTAL ABSENCE OF PANCREAS: ICD-10-CM

## 2025-03-26 PROCEDURE — 99214 OFFICE O/P EST MOD 30 MIN: CPT | Performed by: INTERNAL MEDICINE

## 2025-03-26 RX ORDER — URSODIOL 500 MG/1
1 TABLET TABLET ORAL TWICE A DAY
Qty: 180 | Refills: 3
Start: 2024-01-30

## 2025-03-26 RX ORDER — METFORMIN HYDROCHLORIDE TABLET 500 MG/1
1 TABLET WITH A MEAL TABLET ORAL TWICE A DAY
Qty: 180 TABLET | Status: ACTIVE | COMMUNITY

## 2025-03-26 RX ORDER — URSODIOL 500 MG/1
1 TABLET TABLET ORAL TWICE A DAY
Qty: 180 | Refills: 3 | Status: ACTIVE | COMMUNITY
Start: 2024-01-30

## 2025-03-26 RX ORDER — ATORVASTATIN CALCIUM 20 MG/1
1 TABLET TABLET, FILM COATED ORAL ONCE A DAY
Qty: 30 TABLET | Status: ACTIVE | COMMUNITY

## 2025-03-26 RX ORDER — INSULIN LISPRO 100 [IU]/ML
INJECTION, SOLUTION INTRAVENOUS; SUBCUTANEOUS
Qty: 45 UNSPECIFIED | Status: ACTIVE | COMMUNITY

## 2025-03-26 RX ORDER — INSULIN DETEMIR 100 [IU]/ML
INJECTION, SOLUTION SUBCUTANEOUS
Qty: 60 UNSPECIFIED | Status: ACTIVE | COMMUNITY

## 2025-03-26 RX ORDER — LEVOTHYROXINE SODIUM 150 UG/1
1 TABLET IN THE MORNING ON AN EMPTY STOMACH TABLET ORAL ONCE A DAY
Qty: 90 TABLET | Status: ACTIVE | COMMUNITY

## 2025-03-26 NOTE — HPI-TODAY'S VISIT:
LV 9/2024. She has a prior history of benign anastomotic stricture would presented to the hospital in the past for obstructive jaundice and ended up with a Whipple for benign disease.  She had multiple prior failed ERCP attempts and also had a failed ERCP attempt at a tertiary center at the [facility].  She subsequently had improvement in jaundice and was not having abdominal pain fevers chills or weight loss.  She did have a biliary stent that was placed in 2009 and multiple attempts at removal have not been successful.  She did have a small bowel enteroscopy by Dr. Ryan back in 2018 where a presumed hepaticojejunostomy was seen although no obvious opening was identified.  The orifice could not be cannulated although a pancreatic duct stent was noted as well which was removed.  She did ultimately have biliary drainage established via IR with an internal/external biliary drain and plan was for a rendezvous procedure.  Her initial surgery was in 2004 which ultimately had been complicated by hepaticojejunostomy strictures and recurrent episodes of cholangitis.  She has been lost to follow-up since that time. Labs were recently in September 2023 demonstrated creatinine 0.53, sodium 142, alk phos 151, normal bilirubin at 0.6, and normal LFTs.  Hemoglobin 13.6, platelets 263, white count 7.7.  Iron saturation 15 with a ferritin 102.  A1c 6.2. She tells me she has been having fevers on the weekends, 102-103F. Was given antibiotics in the ER for UTI. Some right-sided discomfort associated with these fevers. No jaundice. She tells me she is drain free at this time, stent free, and she tells me she did have a rendezvous. Had a procedure with Dr. Bradshwa 2-3 years. Last fever 1 month ago. She had a fibroscan at [facility], no fibrosis, some steatosis. Last visit, I wanted to get the records of her last ERCP which was done by Dr. Bradshaw, but may need additional biliary evaluation.  Plan was to get an MRCP to evaluate the bile duct and will then see if she needs an additional ERCP or endoscopy.  MRCP November 2023 demonstrated findings suggestive of acute on chronic cholangitis likely in the setting of chronic biliary stasis and reflux.  She was treated with Cipro and metronidazole for 10 days.  I also referred her to Dr. Bradshaw for consideration of ERCP. She ended up having a ERCP in mid November 2023 with placement of a plastic stent into her bile duct.  Follow-up now. She tells me she had a rough experience post-ERCP, but stent was placed, and was successful. She did have some fever, and threw up post-procedure. She did not have further antibiotics after her ERCP. Has lost some weight. Decrease appetite. Plan was repeat ERCP for removal of biliary plastic stent, but she is better from a cholangitis perspective. Will need to see if her anastomotic stenosis is better, or if she has stones. Will get LFT's. LFT's normal in 1/2024 and repeat ERCP 1/2024 with occluded plastic stent (removed with biliary sweep) and balloon dilation performed to 8 mm. No stent was placed. Repeat LFTs in 2/2024 were normal. She is doing well status post ERCP recently and normal LFTs post procedure. She has been on ursodiol. No fevers, no jaundice. Some RUQ pain. She wsa feeling largely well since her procedure, normal LFTs 2/2024, and will continue dustin to maintain biliary flow. FU 6 months, and repeat ERCPs will be done as needed. She had a cologuard about 3 years ago, and this was negative. CT PE recently with no PE. Had fibroscan with findings consistent with F3/F4 fibrosis. This was done after an US recently showed hepatic steatosis, possible CBD dilation to 1.2 cm. She was having RUQ pain, and this led to US. Not having fevers, no jaundice. Non-toxic. Non-acute situation. Still taking ursodiol. Alk phos was recently 134, normal AST/ALT, normal bilirubin. Has some pruritus. Pain is more so with movement, not so much. Weight is down 2 lbs. Alk phos was slightly elevated compared to prior, so will repeat MRCP to assess for obstruction/cholangitis. She is clinically well and I don't think her RUQ pain is necessarily biliary in nature. Will have to decide on repeat ERCP (which was a difficult procedure) versus surgical revision. May send to tertiary center for next ERCP. Fibroscan is likely an overestimate of fibrosis, which is mainly from biliary reasons most likely (cholangiopathy/biliary cirrhosis). Will repeat fibroscan in 6 months. Fibroscan 8/2024 with S3/F3 fibrosis. MRI/MRCP 8/2024 with CHD 8mm, PD 5mm, and mild left peribiliary enhancement, stable compared to 2023, and mild changes of focal fibrosis in liver. Wanted repeat LFTs. Did not feel re-attempt at ERCP would be necessary. She was not having fevers, feeling well, non-toxic, not having symptoms of cholangitis. Recent LFTs were normal (normal alk phos, normal AST/ALT). No changes consistent with cirrhosis. F3 fibrosis on fibroscan. She has some fatty liver, so advised weight loss, control of diabetes. Fibroscan in 1 year to check on fibrosis (but do think she has biliary cholangiopathy as a cause to her fibrosis so would not do Rezdiffra in her case at this point), repeat LFTs in 6 months, and FU 6 months. No need for ERCP nor surgical intervention at this point. She is afebrile and feeling fine. Recent LFTs with Alk phos to 158, AST/ALT normal. FU now.  Her weight is up 3 lbs since last visit. Alk phos 158. No fevers, no jaundice, no RUQ pain, no signs of cholangitis. No swelling. Doing exercises as well daily usually.

## 2025-03-26 NOTE — PHYSICAL EXAM GASTROINTESTINAL
Abdomen , soft, nontender, nondistended , no guarding or rigidity , no masses palpable , normal bowel sounds , Liver and Spleen , no hepatomegaly present , no hepatosplenomegaly , liver nontender , spleen not palpable Estimated Blood Loss (Cc): minimal

## 2025-03-31 ENCOUNTER — WEB ENCOUNTER (OUTPATIENT)
Dept: URBAN - METROPOLITAN AREA CLINIC 7 | Facility: CLINIC | Age: 65
End: 2025-03-31

## 2025-04-08 ENCOUNTER — TELEPHONE ENCOUNTER (OUTPATIENT)
Dept: URBAN - METROPOLITAN AREA CLINIC 7 | Facility: CLINIC | Age: 65
End: 2025-04-08

## 2025-06-02 ENCOUNTER — LAB OUTSIDE AN ENCOUNTER (OUTPATIENT)
Dept: URBAN - METROPOLITAN AREA CLINIC 7 | Facility: CLINIC | Age: 65
End: 2025-06-02

## 2025-06-03 LAB
ALBUMIN/GLOBULIN RATIO: 1.3
ALBUMIN: 4.3
ALKALINE PHOSPHATASE: 140
ALT (SGPT): 14
AST (SGOT): 22
BILIRUBIN, DIRECT: 0.1
BILIRUBIN, INDIRECT: 0.4
BILIRUBIN, TOTAL: 0.5
GLOBULIN: 3.2
PROTEIN, TOTAL: 7.5

## 2025-06-25 ENCOUNTER — TELEPHONE ENCOUNTER (OUTPATIENT)
Dept: URBAN - METROPOLITAN AREA CLINIC 7 | Facility: CLINIC | Age: 65
End: 2025-06-25

## 2025-06-25 RX ORDER — FAMOTIDINE 40 MG/1
1 TABLET TABLET, FILM COATED ORAL TWICE A DAY
Qty: 180 TABLET | Refills: 3 | OUTPATIENT
Start: 2025-06-26

## 2025-06-26 ENCOUNTER — LAB OUTSIDE AN ENCOUNTER (OUTPATIENT)
Dept: URBAN - METROPOLITAN AREA CLINIC 7 | Facility: CLINIC | Age: 65
End: 2025-06-26

## 2025-06-28 LAB
ALBUMIN/GLOBULIN RATIO: 1.2
ALBUMIN: 4.3
ALKALINE PHOSPHATASE: 127
ALT (SGPT): 11
AST (SGOT): 16
BILIRUBIN, DIRECT: 0.1
BILIRUBIN, INDIRECT: 0.4
BILIRUBIN, TOTAL: 0.5
GLOBULIN: 3.5
PROTEIN, TOTAL: 7.8

## 2025-07-01 ENCOUNTER — LAB OUTSIDE AN ENCOUNTER (OUTPATIENT)
Dept: URBAN - METROPOLITAN AREA CLINIC 7 | Facility: CLINIC | Age: 65
End: 2025-07-01

## 2025-07-02 ENCOUNTER — LAB OUTSIDE AN ENCOUNTER (OUTPATIENT)
Dept: URBAN - METROPOLITAN AREA CLINIC 7 | Facility: CLINIC | Age: 65
End: 2025-07-02

## 2025-07-17 ENCOUNTER — LAB OUTSIDE AN ENCOUNTER (OUTPATIENT)
Dept: URBAN - METROPOLITAN AREA CLINIC 7 | Facility: CLINIC | Age: 65
End: 2025-07-17

## 2025-07-30 ENCOUNTER — OFFICE VISIT (OUTPATIENT)
Dept: URBAN - METROPOLITAN AREA CLINIC 7 | Facility: CLINIC | Age: 65
End: 2025-07-30
Payer: COMMERCIAL

## 2025-07-30 DIAGNOSIS — K76.0 HEPATIC STEATOSIS: ICD-10-CM

## 2025-07-30 DIAGNOSIS — R10.11 RUQ PAIN: ICD-10-CM

## 2025-07-30 DIAGNOSIS — K80.50 CHOLEDOCHOLITHIASIS: ICD-10-CM

## 2025-07-30 DIAGNOSIS — R94.5 LIVER FUNCTION STUDY, ABNORMAL: ICD-10-CM

## 2025-07-30 DIAGNOSIS — Z90.49 ACQUIRED ABSENCE OF OTHER SPECIFIED PARTS OF DIGESTIVE TRACT: ICD-10-CM

## 2025-07-30 DIAGNOSIS — K91.89 OTHER POSTPROCEDURAL COMPLICATIONS AND DISORDERS OF DIGESTIVE SYSTEM: ICD-10-CM

## 2025-07-30 DIAGNOSIS — E11.9 TYPE 2 DIABETES MELLITUS WITHOUT COMPLICATION: ICD-10-CM

## 2025-07-30 DIAGNOSIS — K83.1 OBSTRUCTION OF BILE DUCT: ICD-10-CM

## 2025-07-30 DIAGNOSIS — Z90.410 ACQUIRED TOTAL ABSENCE OF PANCREAS: ICD-10-CM

## 2025-07-30 DIAGNOSIS — Z90.410 HISTORY OF WHIPPLE PROCEDURE: ICD-10-CM

## 2025-07-30 PROCEDURE — 99214 OFFICE O/P EST MOD 30 MIN: CPT | Performed by: INTERNAL MEDICINE

## 2025-07-30 RX ORDER — LEVOTHYROXINE SODIUM 125 UG/1
1 TABLET IN THE MORNING ON AN EMPTY STOMACH TABLET ORAL ONCE A DAY
Qty: 90 TABLET | Status: ACTIVE | COMMUNITY

## 2025-07-30 RX ORDER — INSULIN LISPRO 100 [IU]/ML
INJECTION, SOLUTION INTRAVENOUS; SUBCUTANEOUS
Qty: 45 UNSPECIFIED | Status: ACTIVE | COMMUNITY

## 2025-07-30 RX ORDER — METFORMIN HYDROCHLORIDE TABLET 500 MG/1
1 TABLET WITH A MEAL TABLET ORAL TWICE A DAY
Qty: 180 TABLET | Status: ACTIVE | COMMUNITY

## 2025-07-30 RX ORDER — FAMOTIDINE 40 MG/1
1 TABLET TABLET, FILM COATED ORAL TWICE A DAY
Qty: 180 TABLET | Refills: 3 | Status: ACTIVE | COMMUNITY
Start: 2025-06-26

## 2025-07-30 RX ORDER — URSODIOL 500 MG/1
1 TABLET TABLET ORAL TWICE A DAY
Qty: 180 | Refills: 3 | Status: ACTIVE | COMMUNITY
Start: 2024-01-30

## 2025-07-30 RX ORDER — OMEPRAZOLE 40 MG/1
CAPSULE, DELAYED RELEASE ORAL
Qty: 100 CAPSULE | Status: ACTIVE | COMMUNITY

## 2025-07-30 RX ORDER — ATORVASTATIN CALCIUM 20 MG/1
1 TABLET TABLET, FILM COATED ORAL ONCE A DAY
Qty: 30 TABLET | Status: ACTIVE | COMMUNITY

## 2025-07-30 RX ORDER — INSULIN DETEMIR 100 [IU]/ML
INJECTION, SOLUTION SUBCUTANEOUS
Qty: 60 UNSPECIFIED | Status: ACTIVE | COMMUNITY

## 2025-07-30 NOTE — HPI-TODAY'S VISIT:
LV 3/2025. She has a prior history of benign anastomotic stricture would presented to the hospital in the past for obstructive jaundice and ended up with a Whipple for benign disease.  She had multiple prior failed ERCP attempts and also had a failed ERCP attempt at a tertiary center at the [facility].  She subsequently had improvement in jaundice and was not having abdominal pain fevers chills or weight loss.  She did have a biliary stent that was placed in 2009 and multiple attempts at removal have not been successful.  She did have a small bowel enteroscopy by Dr. Ryan back in 2018 where a presumed hepaticojejunostomy was seen although no obvious opening was identified.  The orifice could not be cannulated although a pancreatic duct stent was noted as well which was removed.  She did ultimately have biliary drainage established via IR with an internal/external biliary drain and plan was for a rendezvous procedure.  Her initial surgery was in 2004 which ultimately had been complicated by hepaticojejunostomy strictures and recurrent episodes of cholangitis.  She has been lost to follow-up since that time. Labs were recently in September 2023 demonstrated creatinine 0.53, sodium 142, alk phos 151, normal bilirubin at 0.6, and normal LFTs.  Hemoglobin 13.6, platelets 263, white count 7.7.  Iron saturation 15 with a ferritin 102.  A1c 6.2. She tells me she has been having fevers on the weekends, 102-103F. Was given antibiotics in the ER for UTI. Some right-sided discomfort associated with these fevers. No jaundice. She tells me she is drain free at this time, stent free, and she tells me she did have a rendezvous. Had a procedure with Dr. Bradshaw 2-3 years. Last fever 1 month ago. She had a fibroscan at [facility], no fibrosis, some steatosis. Last visit, I wanted to get the records of her last ERCP which was done by Dr. Bradshaw, but may need additional biliary evaluation.  Plan was to get an MRCP to evaluate the bile duct and will then see if she needs an additional ERCP or endoscopy.  MRCP November 2023 demonstrated findings suggestive of acute on chronic cholangitis likely in the setting of chronic biliary stasis and reflux.  She was treated with Cipro and metronidazole for 10 days.  I also referred her to Dr. Bradshaw for consideration of ERCP. She ended up having a ERCP in mid November 2023 with placement of a plastic stent into her bile duct.  Follow-up now. She tells me she had a rough experience post-ERCP, but stent was placed, and was successful. She did have some fever, and threw up post-procedure. She did not have further antibiotics after her ERCP. Has lost some weight. Decrease appetite. Plan was repeat ERCP for removal of biliary plastic stent, but she is better from a cholangitis perspective. Will need to see if her anastomotic stenosis is better, or if she has stones. Will get LFT's. LFT's normal in 1/2024 and repeat ERCP 1/2024 with occluded plastic stent (removed with biliary sweep) and balloon dilation performed to 8 mm. No stent was placed. Repeat LFTs in 2/2024 were normal. She is doing well status post ERCP recently and normal LFTs post procedure. She has been on ursodiol. No fevers, no jaundice. Some RUQ pain. She wsa feeling largely well since her procedure, normal LFTs 2/2024, and will continue dustin to maintain biliary flow. FU 6 months, and repeat ERCPs will be done as needed. She had a cologuard about 3 years ago, and this was negative. CT PE recently with no PE. Had fibroscan with findings consistent with F3/F4 fibrosis. This was done after an US recently showed hepatic steatosis, possible CBD dilation to 1.2 cm. She was having RUQ pain, and this led to US. Not having fevers, no jaundice. Non-toxic. Non-acute situation. Still taking ursodiol. Alk phos was recently 134, normal AST/ALT, normal bilirubin. Has some pruritus. Pain is more so with movement, not so much. Weight is down 2 lbs. Alk phos was slightly elevated compared to prior, so will repeat MRCP to assess for obstruction/cholangitis. She is clinically well and I don't think her RUQ pain is necessarily biliary in nature. Will have to decide on repeat ERCP (which was a difficult procedure) versus surgical revision. May send to tertiary center for next ERCP. Fibroscan is likely an overestimate of fibrosis, which is mainly from biliary reasons most likely (cholangiopathy/biliary cirrhosis). Will repeat fibroscan in 6 months. Fibroscan 8/2024 with S3/F3 fibrosis. MRI/MRCP 8/2024 with CHD 8mm, PD 5mm, and mild left peribiliary enhancement, stable compared to 2023, and mild changes of focal fibrosis in liver. Wanted repeat LFTs. Did not feel re-attempt at ERCP would be necessary. She was not having fevers, feeling well, non-toxic, not having symptoms of cholangitis. Recent LFTs were normal (normal alk phos, normal AST/ALT). No changes consistent with cirrhosis. F3 fibrosis on fibroscan. She has some fatty liver, so advised weight loss, control of diabetes. Fibroscan in 1 year to check on fibrosis (but do think she has biliary cholangiopathy as a cause to her fibrosis so would not do Rezdiffra in her case at this point), repeat LFTs in 6 months, and FU 6 months. No need for ERCP nor surgical intervention at this point. She is afebrile and feeling fine. Recent LFTs with Alk phos to 158, AST/ALT normal. Her weight was up 3 lbs since last visit. Alk phos 158. No fevers, no jaundice, no RUQ pain, no signs of cholangitis. No swelling. Doing exercises as well daily usually. Did favor that her alk phos is elevated due to hepatic steatosis but also secondary to some biliary cholangiopathy from biliary anastomosis and likely mild obstruction. Will recheck hepatic function panel in June 2025. She is not particularly symptomatic, no cholangitis. LFTs done twice in June 2025 were normal (including alk phos). MRI/MRCP 6/2025 with stable fibrosis on the right, nodular contour, stable mild-intrahepatic dilation, some filling defects suggestive of biliary reflux and possible developing stones. FU now to discuss. She did have some endometrial bx done recently which was negative. Also had cystoscopy which was negative. Cologuard was recently negative in 2025. She is non-toxic, afebrile, no jaundice. Weight is up 3 lbs. She did have pain about 3 weeks ago, and still on famotidine BID.

## 2025-08-11 ENCOUNTER — OFFICE VISIT (OUTPATIENT)
Dept: URBAN - METROPOLITAN AREA CLINIC 8 | Facility: CLINIC | Age: 65
End: 2025-08-11
Payer: COMMERCIAL

## 2025-08-11 DIAGNOSIS — K76.0 HEPATIC STEATOSIS: ICD-10-CM

## 2025-08-11 PROCEDURE — 76981 USE PARENCHYMA: CPT | Performed by: INTERNAL MEDICINE

## 2025-08-11 RX ORDER — FAMOTIDINE 40 MG/1
1 TABLET TABLET, FILM COATED ORAL TWICE A DAY
Qty: 180 TABLET | Refills: 3 | Status: ACTIVE | COMMUNITY
Start: 2025-06-26

## 2025-08-11 RX ORDER — OMEPRAZOLE 40 MG/1
CAPSULE, DELAYED RELEASE ORAL
Qty: 100 CAPSULE | Status: ACTIVE | COMMUNITY

## 2025-08-11 RX ORDER — INSULIN LISPRO 100 [IU]/ML
INJECTION, SOLUTION INTRAVENOUS; SUBCUTANEOUS
Qty: 45 UNSPECIFIED | Status: ACTIVE | COMMUNITY

## 2025-08-11 RX ORDER — INSULIN DETEMIR 100 [IU]/ML
INJECTION, SOLUTION SUBCUTANEOUS
Qty: 60 UNSPECIFIED | Status: ACTIVE | COMMUNITY

## 2025-08-11 RX ORDER — METFORMIN HYDROCHLORIDE 500 MG/1
1 TABLET WITH A MEAL TABLET, FILM COATED ORAL TWICE A DAY
Qty: 180 TABLET | Status: ACTIVE | COMMUNITY

## 2025-08-11 RX ORDER — ATORVASTATIN CALCIUM 20 MG/1
1 TABLET TABLET, FILM COATED ORAL ONCE A DAY
Qty: 30 TABLET | Status: ACTIVE | COMMUNITY

## 2025-08-11 RX ORDER — URSODIOL 500 MG/1
1 TABLET TABLET ORAL TWICE A DAY
Qty: 180 | Refills: 3 | Status: ACTIVE | COMMUNITY
Start: 2024-01-30

## 2025-08-11 RX ORDER — LEVOTHYROXINE SODIUM 125 UG/1
1 TABLET IN THE MORNING ON AN EMPTY STOMACH TABLET ORAL ONCE A DAY
Qty: 90 TABLET | Status: ACTIVE | COMMUNITY

## 2025-08-21 ENCOUNTER — OFFICE VISIT (OUTPATIENT)
Dept: URBAN - METROPOLITAN AREA CLINIC 7 | Facility: CLINIC | Age: 65
End: 2025-08-21
Payer: COMMERCIAL

## 2025-08-21 DIAGNOSIS — K83.1 OBSTRUCTION OF BILE DUCT: ICD-10-CM

## 2025-08-21 DIAGNOSIS — Z90.49 ACQUIRED ABSENCE OF OTHER SPECIFIED PARTS OF DIGESTIVE TRACT: ICD-10-CM

## 2025-08-21 DIAGNOSIS — K21.9 ACID REFLUX: ICD-10-CM

## 2025-08-21 DIAGNOSIS — Z90.410 ACQUIRED TOTAL ABSENCE OF PANCREAS: ICD-10-CM

## 2025-08-21 DIAGNOSIS — R94.5 LIVER FUNCTION STUDY, ABNORMAL: ICD-10-CM

## 2025-08-21 DIAGNOSIS — K76.0 HEPATIC STEATOSIS: ICD-10-CM

## 2025-08-21 PROCEDURE — 99214 OFFICE O/P EST MOD 30 MIN: CPT

## 2025-08-21 RX ORDER — LEVOTHYROXINE SODIUM 125 UG/1
1 TABLET IN THE MORNING ON AN EMPTY STOMACH TABLET ORAL ONCE A DAY
Qty: 90 TABLET | Status: ACTIVE | COMMUNITY

## 2025-08-21 RX ORDER — OMEPRAZOLE 40 MG/1
1 CAPSULE 1/2 TO 1 HOUR BEFORE MORNING MEAL CAPSULE, DELAYED RELEASE ORAL DAILY
Qty: 90 | Refills: 3

## 2025-08-21 RX ORDER — OMEPRAZOLE 40 MG/1
CAPSULE, DELAYED RELEASE ORAL
Qty: 100 CAPSULE | Status: ACTIVE | COMMUNITY

## 2025-08-21 RX ORDER — INSULIN LISPRO 100 [IU]/ML
INJECTION, SOLUTION INTRAVENOUS; SUBCUTANEOUS
Qty: 45 UNSPECIFIED | Status: ACTIVE | COMMUNITY

## 2025-08-21 RX ORDER — METFORMIN HYDROCHLORIDE 500 MG/1
1 TABLET WITH A MEAL TABLET, FILM COATED ORAL TWICE A DAY
Qty: 180 TABLET | Status: ACTIVE | COMMUNITY

## 2025-08-21 RX ORDER — INSULIN DETEMIR 100 [IU]/ML
INJECTION, SOLUTION SUBCUTANEOUS
Qty: 60 UNSPECIFIED | Status: ACTIVE | COMMUNITY

## 2025-08-21 RX ORDER — FAMOTIDINE 40 MG/1
1 TABLET TABLET, FILM COATED ORAL TWICE A DAY
Qty: 180 TABLET | Refills: 3 | Status: ACTIVE | COMMUNITY
Start: 2025-06-26

## 2025-08-21 RX ORDER — ATORVASTATIN CALCIUM 20 MG/1
1 TABLET TABLET, FILM COATED ORAL ONCE A DAY
Qty: 30 TABLET | Status: ACTIVE | COMMUNITY

## 2025-08-21 RX ORDER — URSODIOL 500 MG/1
1 TABLET TABLET ORAL TWICE A DAY
Qty: 180 | Refills: 3 | Status: ACTIVE | COMMUNITY
Start: 2024-01-30